# Patient Record
Sex: FEMALE | Race: WHITE | NOT HISPANIC OR LATINO | Employment: FULL TIME | URBAN - METROPOLITAN AREA
[De-identification: names, ages, dates, MRNs, and addresses within clinical notes are randomized per-mention and may not be internally consistent; named-entity substitution may affect disease eponyms.]

---

## 2018-09-04 ENCOUNTER — HOSPITAL ENCOUNTER (EMERGENCY)
Facility: HOSPITAL | Age: 58
Discharge: HOME | End: 2018-09-04
Attending: EMERGENCY MEDICINE | Admitting: EMERGENCY MEDICINE
Payer: COMMERCIAL

## 2018-09-04 ENCOUNTER — APPOINTMENT (EMERGENCY)
Dept: RADIOLOGY | Facility: HOSPITAL | Age: 58
End: 2018-09-04
Payer: COMMERCIAL

## 2018-09-04 VITALS
OXYGEN SATURATION: 98 % | BODY MASS INDEX: 26.07 KG/M2 | HEART RATE: 70 BPM | WEIGHT: 176 LBS | SYSTOLIC BLOOD PRESSURE: 128 MMHG | RESPIRATION RATE: 18 BRPM | DIASTOLIC BLOOD PRESSURE: 60 MMHG | TEMPERATURE: 98.2 F | HEIGHT: 69 IN

## 2018-09-04 DIAGNOSIS — T14.8XXA MUSCLE STRAIN: Primary | ICD-10-CM

## 2018-09-04 LAB
ANION GAP SERPL CALCULATED.3IONS-SCNC: 7 MMOL/L (ref 4–13)
ATRIAL RATE: 67 BPM
BUN SERPL-MCNC: 9 MG/DL (ref 7–25)
CALCIUM SERPL-MCNC: 9.3 MG/DL (ref 8.6–10.5)
CHLORIDE SERPL-SCNC: 107 MMOL/L (ref 98–107)
CO2 SERPL-SCNC: 27 MMOL/L (ref 21–31)
CREAT SERPL-MCNC: 0.89 MG/DL (ref 0.6–1.2)
EOSINOPHIL # BLD AUTO: 0.17 THOUSAND/UL (ref 0–0.61)
EOSINOPHIL NFR BLD MANUAL: 2 % (ref 0–6)
ERYTHROCYTE [DISTWIDTH] IN BLOOD BY AUTOMATED COUNT: 13 % (ref 11.5–14.5)
GFR SERPL CREATININE-BSD FRML MDRD: 72 ML/MIN/1.73SQ M
GLUCOSE SERPL-MCNC: 101 MG/DL (ref 65–99)
HCT VFR BLD AUTO: 39.2 % (ref 34.8–46.1)
HGB BLD-MCNC: 13.2 G/DL (ref 12–16)
HOLD SPECIMEN: NORMAL
LYMPHOCYTES # BLD AUTO: 2.94 THOUSAND/UL (ref 0.6–4.47)
LYMPHOCYTES # BLD AUTO: 35 % (ref 20–51)
MCH RBC QN AUTO: 31.1 PG (ref 26–34)
MCHC RBC AUTO-ENTMCNC: 33.8 G/DL (ref 31–37)
MCV RBC AUTO: 92 FL (ref 81–99)
MONOCYTES # BLD AUTO: 1.34 THOUSAND/UL (ref 0–1.22)
MONOCYTES NFR BLD AUTO: 16 % (ref 4–12)
NEUTS SEG # BLD: 3.95 THOUSAND/UL (ref 1.81–6.82)
NEUTS SEG NFR BLD AUTO: 47 % (ref 42–75)
NRBC BLD AUTO-RTO: 0 /100 WBCS
P AXIS: 66 DEGREES
PLATELET # BLD AUTO: 277 THOUSANDS/UL (ref 149–390)
PLATELET BLD QL SMEAR: ADEQUATE
PMV BLD AUTO: 8.3 FL (ref 8.6–11.7)
POTASSIUM SERPL-SCNC: 4.1 MMOL/L (ref 3.5–5.5)
PR INTERVAL: 140 MS
QRS AXIS: 59 DEGREES
QRSD INTERVAL: 86 MS
QT INTERVAL: 396 MS
QTC INTERVAL: 418 MS
RBC # BLD AUTO: 4.25 MILLION/UL (ref 3.9–5.2)
SODIUM SERPL-SCNC: 141 MMOL/L (ref 134–143)
T WAVE AXIS: 67 DEGREES
TOTAL CELLS COUNTED SPEC: 100
TROPONIN I SERPL-MCNC: <0.03 NG/ML
VENTRICULAR RATE: 67 BPM
WBC # BLD AUTO: 8.4 THOUSAND/UL (ref 4.8–10.8)

## 2018-09-04 PROCEDURE — 93005 ELECTROCARDIOGRAM TRACING: CPT

## 2018-09-04 PROCEDURE — 80048 BASIC METABOLIC PNL TOTAL CA: CPT | Performed by: EMERGENCY MEDICINE

## 2018-09-04 PROCEDURE — 84484 ASSAY OF TROPONIN QUANT: CPT | Performed by: EMERGENCY MEDICINE

## 2018-09-04 PROCEDURE — 71046 X-RAY EXAM CHEST 2 VIEWS: CPT

## 2018-09-04 PROCEDURE — 99284 EMERGENCY DEPT VISIT MOD MDM: CPT

## 2018-09-04 PROCEDURE — 73030 X-RAY EXAM OF SHOULDER: CPT

## 2018-09-04 PROCEDURE — 36415 COLL VENOUS BLD VENIPUNCTURE: CPT | Performed by: EMERGENCY MEDICINE

## 2018-09-04 PROCEDURE — 85007 BL SMEAR W/DIFF WBC COUNT: CPT | Performed by: EMERGENCY MEDICINE

## 2018-09-04 PROCEDURE — 85027 COMPLETE CBC AUTOMATED: CPT | Performed by: EMERGENCY MEDICINE

## 2018-09-04 RX ORDER — KETOROLAC TROMETHAMINE 10 MG/1
10 TABLET, FILM COATED ORAL EVERY 6 HOURS PRN
Qty: 20 TABLET | Refills: 0 | Status: SHIPPED | OUTPATIENT
Start: 2018-09-04

## 2018-09-04 RX ORDER — HYDROCODONE BITARTRATE AND ACETAMINOPHEN 5; 325 MG/1; MG/1
1 TABLET ORAL EVERY 6 HOURS PRN
Qty: 11 TABLET | Refills: 0 | Status: SHIPPED | OUTPATIENT
Start: 2018-09-04 | End: 2018-09-14

## 2018-09-04 RX ORDER — MULTIVITAMIN
1 TABLET ORAL DAILY
COMMUNITY

## 2018-09-04 NOTE — ED PROVIDER NOTES
History  Chief Complaint   Patient presents with    Shoulder Pain     isai spears left shoulder pain that began a few days ago  denies injury     Patient complaining of pain in her left shoulder area, holding her left trapezius muscle  She indicates that she did not injure the area but is just become worse and worse, disrupting her sleep  She also indicates her left scapula is painful and that the pain radiates down the arm causing swelling of her fingers  She denies any neck pain  She denies any aggravation of the pain with neck movement  She also noted some throbbing and sharp pain radiating across her upper chest starting from the anterior shoulder region  She is concerned as she had a sibling with coronary artery disease in their early 46s  The patient notes that she has been taking 3 Aleve twice a day for a few days with no improvement whatsoever  Prior to Admission Medications   Prescriptions Last Dose Informant Patient Reported? Taking? Multiple Vitamin (MULTIVITAMIN) tablet   Yes Yes   Sig: Take 1 tablet by mouth daily      Facility-Administered Medications: None       History reviewed  No pertinent past medical history  History reviewed  No pertinent surgical history  History reviewed  No pertinent family history  I have reviewed and agree with the history as documented  Social History   Substance Use Topics    Smoking status: Light Tobacco Smoker     Packs/day: 0 20    Smokeless tobacco: Never Used    Alcohol use No        Review of Systems   Constitutional: Negative for chills, fatigue and fever  Respiratory: Negative for chest tightness and shortness of breath  Cardiovascular:        Left upper chest wall pain, reproducible  Gastrointestinal: Positive for abdominal pain  Negative for abdominal distention  Musculoskeletal: Negative for back pain, neck pain and neck stiffness  Skin: Negative for rash and wound     Neurological: Negative for dizziness, numbness and headaches  Some occasional tingling noted in the left forearm and fingers  Physical Exam  Physical Exam   Constitutional: She appears well-developed and well-nourished  Patient is a pleasant ambulatory 24-year-old female who appears mildly uncomfortable with left upper extremity pain  She is alert and oriented with appropriate speech and affect  HENT:   Head: Normocephalic and atraumatic  Right Ear: External ear normal    Left Ear: External ear normal    Eyes: Conjunctivae and EOM are normal  Right eye exhibits no discharge  Left eye exhibits no discharge  Neck:   Left and right rotation are symmetric, to approximately 80° bilaterally  Nontender  No palpable tenderness to firm palpation of the cervical spine or thoracic spine  Cardiovascular: Normal rate, regular rhythm and normal heart sounds  Consistent palpable tenderness of the left upper chest wall in the subclavicular region  No crepitus  Pulmonary/Chest: Effort normal and breath sounds normal  No respiratory distress  She has no wheezes  Musculoskeletal: She exhibits no edema or deformity  Moderate spasm of the left trapezius  Slightly more pronounced spasm of the left thoracic para spinal muscles  Minimal swelling of the fingers of the left hand  No other edema appreciated  Neurological: She is alert  No cranial nerve deficit  Skin: Skin is warm and dry  Capillary refill takes less than 2 seconds  No rash noted  No erythema  No pallor  Vitals reviewed        Vital Signs  ED Triage Vitals [09/04/18 1100]   Temperature Pulse Respirations Blood Pressure SpO2   98 °F (36 7 °C) 83 16 141/73 96 %      Temp Source Heart Rate Source Patient Position - Orthostatic VS BP Location FiO2 (%)   Temporal Monitor Sitting Left arm --      Pain Score       5           Vitals:    09/04/18 1100 09/04/18 1216 09/04/18 1322   BP: 141/73 121/59 128/60   Pulse: 83 65 70   Patient Position - Orthostatic VS: Sitting  Lying       Visual Acuity      ED Medications  Medications - No data to display    Diagnostic Studies  Results Reviewed     Procedure Component Value Units Date/Time    Basic metabolic panel [[id number]]  (Abnormal) Collected:  09/04/18 1130    Lab Status:  Final result Specimen:  Blood from Arm, Left Updated:  09/04/18 1214     Sodium 141 mmol/L      Potassium 4 1 mmol/L      Chloride 107 mmol/L      CO2 27 mmol/L      ANION GAP 7 mmol/L      BUN 9 mg/dL      Creatinine 0 89 mg/dL      Glucose 101 (H) mg/dL      Calcium 9 3 mg/dL      eGFR 72 ml/min/1 73sq m     Narrative:         National Kidney Disease Education Program recommendations are as follows:  GFR calculation is accurate only with a steady state creatinine  Chronic Kidney disease less than 60 ml/min/1 73 sq  meters  Kidney failure less than 15 ml/min/1 73 sq  meters  Troponin I [[id number]]  (Normal) Collected:  09/04/18 1130    Lab Status:  Final result Specimen:  Blood from Arm, Left Updated:  09/04/18 1212     Troponin I <0 03 ng/mL     CBC and differential [[id number]]  (Abnormal) Collected:  09/04/18 1130    Lab Status:  Final result Specimen:  Blood from Arm, Left Updated:  09/04/18 1156     WBC 8 40 Thousand/uL      RBC 4 25 Million/uL      Hemoglobin 13 2 g/dL      Hematocrit 39 2 %      MCV 92 fL      MCH 31 1 pg      MCHC 33 8 g/dL      RDW 13 0 %      MPV 8 3 (L) fL      Platelets 115 Thousands/uL      nRBC 0 /100 WBCs     Fleming draw [[id number]] Collected:  09/04/18 1132    Lab Status:  Final result Specimen:  Blood from Arm, Left Updated:  09/04/18 1145    Narrative: The following orders were created for panel order Fleming draw    Procedure                               Abnormality         Status                     ---------                               -----------         ------                     Josephus Primrose Top on XNTK[[id number]]                            Final result               Green / Black tube on FKWY[[id number]]                        Final result                 Please view results for these tests on the individual orders  XR chest 2 views   Final Result by Lee Hanks (09/04 1220)   No acute thoracic findings  Signed by Ambrose Carmona MD      XR shoulder 2+ views LEFT   Final Result by Marty Orozco (09/04 1210)   Possible chronic rotator cuff pathology  Signed by Ksenia Hernandez MD                 Procedures  Procedures       Phone Contacts  ED Phone Contact    ED Course        examination and evaluation:  As noted patient having left upper extremity pain, with radiation across the left hemithorax  Because of that historical component, laboratory studies and EKG were performed  Troponin is normal and EKG is normal  On physical exam there is definitive reproducible tenderness of both the upper chest wall, the trapezius, upper thoracic muscles as well as the deltoid region which suggest both a muscle strain as well as a bursitis  X-ray show potential chronic rotator cuff pathology  Overall  She did prefer discharge, with likely muscle strain and bursitis  As noted EKG and troponin tests are normal                         MDM  CritCare Time    Disposition  Final diagnoses:   Muscle strain     Time reflects when diagnosis was documented in both MDM as applicable and the Disposition within this note     Time User Action Codes Description Comment    9/4/2018  1:39 PM Fortunato Elder 298  8XXA] Muscle strain       ED Disposition     ED Disposition Condition Comment    Discharge  Thien Ser discharge to home/self care      Condition at discharge: Stable        Follow-up Information    None         Discharge Medication List as of 9/4/2018  1:44 PM      START taking these medications    Details   HYDROcodone-acetaminophen (NORCO) 5-325 mg per tablet Take 1 tablet by mouth every 6 (six) hours as needed for pain for up to 10 days Max Daily Amount: 4 tablets, Starting Tue 9/4/2018, Until Fri 9/14/2018, Print ketorolac (TORADOL) 10 mg tablet Take 1 tablet (10 mg total) by mouth every 6 (six) hours as needed for moderate pain, Starting Tue 9/4/2018, Print         CONTINUE these medications which have NOT CHANGED    Details   Multiple Vitamin (MULTIVITAMIN) tablet Take 1 tablet by mouth daily, Historical Med           No discharge procedures on file      ED Provider  Electronically Signed by           Hilda Nieto DO  09/04/18 9962

## 2018-09-04 NOTE — DISCHARGE INSTRUCTIONS
YOUR EXAM SUGGESTS MUSCLE STRAIN AND SPASM OF YOUR TRAPEZIUS MUSCLE AS WELL AS A BURSITIS OF YOUR ROTATOR CUFF ON THE LEFT UPPER EXTREMITY  USE THE SLING FOR COMFORT  ADEQUATELY THROUGHOUT THE DAY BUT NOT CONSTANTLY  TAKE TORADOL AS PRESCRIBED  DO NOT USE IBUPROFEN OR ALEVE WHILE TAKING TORADOL  FOR UNCONTROLLED PAIN USE THE VICODIN PRESCRIPTION  PLEASE CALL YOUR FAMILY DOCTOR TODAY TO DISCUSS THE POTENTIAL ORTHOPEDIC OR PHYSICAL THERAPY REFERRAL  Muscle Strain   WHAT YOU NEED TO KNOW:   A muscle strain is a twist, pull, or tear of a muscle or tendon  A tendon is a strong elastic tissue that connects a muscle to a bone  Signs of a strained muscle include bruising and swelling over the area, pain with movement, and loss of strength  DISCHARGE INSTRUCTIONS:   Return to the emergency department if:   · You suddenly cannot feel or move your injured muscle  Contact your healthcare provider if:   · Your pain and swelling worsen or do not go away  · You have questions or concerns about your condition or care  Medicines:   · NSAIDs  help decrease swelling and pain or fever  This medicine is available with or without a doctor's order  NSAIDs can cause stomach bleeding or kidney problems in certain people  If you take blood thinner medicine, always ask your healthcare provider if NSAIDs are safe for you  Always read the medicine label and follow directions  · Muscle relaxers  help decrease pain and muscle spasms  · Take your medicine as directed  Contact your healthcare provider if you think your medicine is not helping or if you have side effects  Tell him of her if you are allergic to any medicine  Keep a list of the medicines, vitamins, and herbs you take  Include the amounts, and when and why you take them  Bring the list or the pill bottles to follow-up visits  Carry your medicine list with you in case of an emergency  Follow up with your healthcare provider as directed:   Your healthcare provider may suggest that you have a follow-up visit before you go back to your usual activity  Write down your questions so you remember to ask them during your visits  Self-care:   · 3 to 7 days after the injury:  Use Rest, Ice, Compression, and Elevation (RICE) to help stop bruising and decrease pain and swelling  ¨ Rest:  Rest your muscle to allow your injury to heal  When the pain decreases, begin normal, slow movements  For mild and moderate muscle strains, you should rest your muscles for about 2 days  However, if you have a severe muscle strain, you should rest for 10 to 14 days  You may need to use crutches to walk if your muscle strain is in your legs or lower body  ¨ Ice:  Put an ice pack on the injured area  Put a towel between the ice pack and your skin  Do not put the ice pack directly on your skin  You can use a package of frozen peas instead of an ice pack  ¨ Compression:  You may need to wrap an elastic bandage around the area to decrease swelling  It should be tight enough for you to feel support  Do not wrap it too tightly  ¨ Elevation:  Keep the injured muscle raised above your heart if possible  For example if you have a strain of your lower leg muscle, lie down and prop your leg up on pillows  This helps decrease pain and swelling  · 3 to 21 days after the injury:  Start to slowly and regularly exercise your muscle  This will help it heal  If you feel pain, decrease how hard you are exercising  · 1 to 6 weeks after the injury:  Stretch the injured muscle  Hold the stretch for about 30 seconds  Do this 4 times a day  You may stretch the muscle until you feel a slight pull  Stop stretching if you feel pain  · 2 weeks to 6 months after the injury:  The goal of this phase is to return to the activity you were doing before the injury happened, without hurting the muscle again       · 3 weeks to 6 months after the injury:  Keep stretching and strengthening your muscles to avoid injury  Slowly increase the time and distance that you exercise  You may have signs and symptoms of muscle strain 6 months after the injury, even if you do things to help it heal  In this case, you may need surgery on the muscle  © 2017 [employer] Information is for End User's use only and may not be sold, redistributed or otherwise used for commercial purposes  All illustrations and images included in CareNotes® are the copyrighted property of A [employer] M [employer]  or Skip Saldivar  The above information is an  only  It is not intended as medical advice for individual conditions or treatments  Talk to your doctor, nurse or pharmacist before following any medical regimen to see if it is safe and effective for you

## 2019-04-26 ENCOUNTER — OFFICE VISIT (OUTPATIENT)
Dept: OBGYN CLINIC | Facility: MEDICAL CENTER | Age: 59
End: 2019-04-26
Payer: COMMERCIAL

## 2019-04-26 VITALS
SYSTOLIC BLOOD PRESSURE: 120 MMHG | DIASTOLIC BLOOD PRESSURE: 60 MMHG | WEIGHT: 181.2 LBS | HEIGHT: 69 IN | BODY MASS INDEX: 26.84 KG/M2

## 2019-04-26 DIAGNOSIS — Z01.419 ENCOUNTER FOR WELL WOMAN EXAM WITH ROUTINE GYNECOLOGICAL EXAM: Primary | ICD-10-CM

## 2019-04-26 DIAGNOSIS — Z12.31 ENCOUNTER FOR SCREENING MAMMOGRAM FOR MALIGNANT NEOPLASM OF BREAST: ICD-10-CM

## 2019-04-26 PROCEDURE — S0610 ANNUAL GYNECOLOGICAL EXAMINA: HCPCS | Performed by: OBSTETRICS & GYNECOLOGY

## 2019-04-30 LAB
CLINICAL INFO: NORMAL
CYTOLOGY CMNT CVX/VAG CYTO-IMP: NORMAL
DATE PREVIOUS BX: NORMAL
HPV E6+E7 MRNA CVX QL NAA+PROBE: NOT DETECTED
LMP START DATE: NORMAL
SL AMB PREV. PAP:: NORMAL
SPECIMEN SOURCE CVX/VAG CYTO: NORMAL

## 2019-05-09 ENCOUNTER — HOSPITAL ENCOUNTER (OUTPATIENT)
Dept: MAMMOGRAPHY | Facility: HOSPITAL | Age: 59
Discharge: HOME | End: 2019-05-09
Attending: OBSTETRICS & GYNECOLOGY
Payer: COMMERCIAL

## 2019-05-09 VITALS — BODY MASS INDEX: 26.81 KG/M2 | HEIGHT: 69 IN | WEIGHT: 181 LBS

## 2019-05-09 DIAGNOSIS — Z12.31 ENCOUNTER FOR SCREENING MAMMOGRAM FOR MALIGNANT NEOPLASM OF BREAST: ICD-10-CM

## 2019-05-09 PROCEDURE — 77063 BREAST TOMOSYNTHESIS BI: CPT

## 2019-05-09 PROCEDURE — 77067 SCR MAMMO BI INCL CAD: CPT

## 2020-12-31 ENCOUNTER — LAB (OUTPATIENT)
Dept: LAB | Facility: HOSPITAL | Age: 60
End: 2020-12-31
Attending: INTERNAL MEDICINE
Payer: COMMERCIAL

## 2020-12-31 ENCOUNTER — TRANSCRIBE ORDERS (OUTPATIENT)
Dept: LAB | Facility: HOSPITAL | Age: 60
End: 2020-12-31

## 2020-12-31 DIAGNOSIS — R53.83 FATIGUE, UNSPECIFIED TYPE: ICD-10-CM

## 2020-12-31 DIAGNOSIS — M25.50 ARTHRALGIA, UNSPECIFIED JOINT: ICD-10-CM

## 2020-12-31 DIAGNOSIS — Z79.899 ENCOUNTER FOR LONG-TERM (CURRENT) USE OF OTHER MEDICATIONS: ICD-10-CM

## 2020-12-31 DIAGNOSIS — R73.9 HYPERGLYCEMIA: ICD-10-CM

## 2020-12-31 DIAGNOSIS — M25.50 ARTHRALGIA, UNSPECIFIED JOINT: Primary | ICD-10-CM

## 2020-12-31 LAB
25(OH)D3 SERPL-MCNC: 15.2 NG/ML (ref 30–100)
ALBUMIN SERPL BCP-MCNC: 4.1 G/DL (ref 3.5–5.7)
ALP SERPL-CCNC: 53 U/L (ref 55–165)
ALT SERPL W P-5'-P-CCNC: 13 U/L (ref 7–52)
ANION GAP SERPL CALCULATED.3IONS-SCNC: 6 MMOL/L (ref 4–13)
AST SERPL W P-5'-P-CCNC: 15 U/L (ref 13–39)
BILIRUB SERPL-MCNC: 0.2 MG/DL (ref 0.2–1)
BUN SERPL-MCNC: 19 MG/DL (ref 7–25)
CALCIUM SERPL-MCNC: 9 MG/DL (ref 8.6–10.5)
CHLORIDE SERPL-SCNC: 105 MMOL/L (ref 98–107)
CHOLEST SERPL-MCNC: 194 MG/DL (ref 0–200)
CO2 SERPL-SCNC: 28 MMOL/L (ref 21–31)
CREAT SERPL-MCNC: 0.82 MG/DL (ref 0.6–1.2)
ERYTHROCYTE [DISTWIDTH] IN BLOOD BY AUTOMATED COUNT: 13.4 % (ref 11.5–14.5)
ERYTHROCYTE [SEDIMENTATION RATE] IN BLOOD: 34 MM/HOUR (ref 0–29)
EST. AVERAGE GLUCOSE BLD GHB EST-MCNC: 120 MG/DL
GFR SERPL CREATININE-BSD FRML MDRD: 78 ML/MIN/1.73SQ M
GLUCOSE P FAST SERPL-MCNC: 85 MG/DL (ref 65–99)
HBA1C MFR BLD: 5.8 %
HCT VFR BLD AUTO: 38.1 % (ref 42–47)
HCV AB SER QL: NORMAL
HDLC SERPL-MCNC: 60 MG/DL
HGB BLD-MCNC: 13 G/DL (ref 12–16)
LDLC SERPL CALC-MCNC: 112 MG/DL (ref 0–100)
MCH RBC QN AUTO: 31.9 PG (ref 26–34)
MCHC RBC AUTO-ENTMCNC: 34 G/DL (ref 31–37)
MCV RBC AUTO: 94 FL (ref 81–99)
NONHDLC SERPL-MCNC: 134 MG/DL
PLATELET # BLD AUTO: 295 THOUSANDS/UL (ref 149–390)
PMV BLD AUTO: 8.7 FL (ref 8.6–11.7)
POTASSIUM SERPL-SCNC: 4.2 MMOL/L (ref 3.5–5.5)
PROT SERPL-MCNC: 7 G/DL (ref 6.4–8.9)
RBC # BLD AUTO: 4.07 MILLION/UL (ref 3.9–5.2)
SODIUM SERPL-SCNC: 139 MMOL/L (ref 134–143)
T4 FREE SERPL-MCNC: 0.85 NG/DL (ref 0.76–1.46)
TRIGL SERPL-MCNC: 109 MG/DL (ref 44–166)
TSH SERPL DL<=0.05 MIU/L-ACNC: 2.47 UIU/ML (ref 0.45–5.33)
WBC # BLD AUTO: 7.9 THOUSAND/UL (ref 4.8–10.8)

## 2020-12-31 PROCEDURE — 86803 HEPATITIS C AB TEST: CPT

## 2020-12-31 PROCEDURE — 84482 T3 REVERSE: CPT

## 2020-12-31 PROCEDURE — 84443 ASSAY THYROID STIM HORMONE: CPT

## 2020-12-31 PROCEDURE — 84439 ASSAY OF FREE THYROXINE: CPT

## 2020-12-31 PROCEDURE — 80053 COMPREHEN METABOLIC PANEL: CPT

## 2020-12-31 PROCEDURE — 36415 COLL VENOUS BLD VENIPUNCTURE: CPT

## 2020-12-31 PROCEDURE — 85652 RBC SED RATE AUTOMATED: CPT

## 2020-12-31 PROCEDURE — 82306 VITAMIN D 25 HYDROXY: CPT

## 2020-12-31 PROCEDURE — 80061 LIPID PANEL: CPT

## 2020-12-31 PROCEDURE — 85027 COMPLETE CBC AUTOMATED: CPT

## 2020-12-31 PROCEDURE — 83036 HEMOGLOBIN GLYCOSYLATED A1C: CPT

## 2021-01-06 LAB — T3REVERSE SERPL-MCNC: 11.7 NG/DL (ref 9.2–24.1)

## 2021-01-25 ENCOUNTER — TRANSCRIBE ORDERS (OUTPATIENT)
Dept: ADMINISTRATIVE | Facility: HOSPITAL | Age: 61
End: 2021-01-25

## 2021-01-25 DIAGNOSIS — Z12.31 ENCOUNTER FOR SCREENING MAMMOGRAM FOR MALIGNANT NEOPLASM OF BREAST: Primary | ICD-10-CM

## 2021-01-29 ENCOUNTER — HOSPITAL ENCOUNTER (OUTPATIENT)
Dept: MAMMOGRAPHY | Facility: HOSPITAL | Age: 61
Discharge: HOME | End: 2021-01-29
Payer: COMMERCIAL

## 2021-01-29 VITALS — HEIGHT: 69 IN | BODY MASS INDEX: 26.81 KG/M2 | WEIGHT: 181 LBS

## 2021-01-29 DIAGNOSIS — Z12.31 ENCOUNTER FOR SCREENING MAMMOGRAM FOR MALIGNANT NEOPLASM OF BREAST: ICD-10-CM

## 2021-01-29 PROCEDURE — 77063 BREAST TOMOSYNTHESIS BI: CPT

## 2021-01-29 PROCEDURE — 77067 SCR MAMMO BI INCL CAD: CPT

## 2021-03-10 DIAGNOSIS — Z23 ENCOUNTER FOR IMMUNIZATION: ICD-10-CM

## 2021-03-16 ENCOUNTER — IMMUNIZATIONS (OUTPATIENT)
Dept: FAMILY MEDICINE CLINIC | Facility: HOSPITAL | Age: 61
End: 2021-03-16

## 2021-03-16 DIAGNOSIS — Z23 ENCOUNTER FOR IMMUNIZATION: Primary | ICD-10-CM

## 2021-03-16 PROCEDURE — 0001A SARS-COV-2 / COVID-19 MRNA VACCINE (PFIZER-BIONTECH) 30 MCG: CPT

## 2021-03-16 PROCEDURE — 91300 SARS-COV-2 / COVID-19 MRNA VACCINE (PFIZER-BIONTECH) 30 MCG: CPT

## 2021-04-08 ENCOUNTER — IMMUNIZATIONS (OUTPATIENT)
Dept: FAMILY MEDICINE CLINIC | Facility: HOSPITAL | Age: 61
End: 2021-04-08

## 2021-04-08 DIAGNOSIS — Z23 ENCOUNTER FOR IMMUNIZATION: Primary | ICD-10-CM

## 2021-04-08 PROCEDURE — 91300 SARS-COV-2 / COVID-19 MRNA VACCINE (PFIZER-BIONTECH) 30 MCG: CPT

## 2021-04-08 PROCEDURE — 0002A SARS-COV-2 / COVID-19 MRNA VACCINE (PFIZER-BIONTECH) 30 MCG: CPT

## 2021-08-24 ENCOUNTER — APPOINTMENT (OUTPATIENT)
Dept: RADIOLOGY | Facility: CLINIC | Age: 61
End: 2021-08-24
Payer: COMMERCIAL

## 2021-08-24 ENCOUNTER — OFFICE VISIT (OUTPATIENT)
Dept: URGENT CARE | Facility: CLINIC | Age: 61
End: 2021-08-24
Payer: COMMERCIAL

## 2021-08-24 VITALS
SYSTOLIC BLOOD PRESSURE: 132 MMHG | HEART RATE: 86 BPM | HEIGHT: 69 IN | WEIGHT: 168 LBS | TEMPERATURE: 98.1 F | BODY MASS INDEX: 24.88 KG/M2 | DIASTOLIC BLOOD PRESSURE: 62 MMHG | RESPIRATION RATE: 18 BRPM | OXYGEN SATURATION: 97 %

## 2021-08-24 DIAGNOSIS — R07.81 RIB PAIN ON RIGHT SIDE: ICD-10-CM

## 2021-08-24 DIAGNOSIS — M54.50 ACUTE BILATERAL LOW BACK PAIN WITHOUT SCIATICA: ICD-10-CM

## 2021-08-24 DIAGNOSIS — R07.81 RIB PAIN ON RIGHT SIDE: Primary | ICD-10-CM

## 2021-08-24 PROCEDURE — 96372 THER/PROPH/DIAG INJ SC/IM: CPT | Performed by: PHYSICIAN ASSISTANT

## 2021-08-24 PROCEDURE — 99213 OFFICE O/P EST LOW 20 MIN: CPT | Performed by: PHYSICIAN ASSISTANT

## 2021-08-24 PROCEDURE — 71101 X-RAY EXAM UNILAT RIBS/CHEST: CPT

## 2021-08-24 RX ORDER — PREDNISONE 10 MG/1
TABLET ORAL
Qty: 26 TABLET | Refills: 0 | Status: SHIPPED | OUTPATIENT
Start: 2021-08-24

## 2021-08-24 RX ORDER — KETOROLAC TROMETHAMINE 30 MG/ML
30 INJECTION, SOLUTION INTRAMUSCULAR; INTRAVENOUS ONCE
Status: COMPLETED | OUTPATIENT
Start: 2021-08-24 | End: 2021-08-24

## 2021-08-24 RX ORDER — CYCLOBENZAPRINE HCL 10 MG
10 TABLET ORAL 3 TIMES DAILY PRN
Qty: 30 TABLET | Refills: 0 | Status: SHIPPED | OUTPATIENT
Start: 2021-08-24

## 2021-08-24 RX ADMIN — KETOROLAC TROMETHAMINE 30 MG: 30 INJECTION, SOLUTION INTRAMUSCULAR; INTRAVENOUS at 19:26

## 2021-08-24 NOTE — PROGRESS NOTES
[facility]    NAME: Ernesto Latif is a 64 y o  female  : 1960    MRN: [id number]  DATE: 2021  TIME: 7:06 PM    Assessment and Plan   Rib pain on right side [R07 81]  1  Rib pain on right side  XR ribs right w pa chest min 3 views    ketorolac (TORADOL) injection 30 mg   2  Acute bilateral low back pain without sciatica  predniSONE 10 mg tablet    cyclobenzaprine (FLEXERIL) 10 mg tablet       Patient Instructions   Patient Instructions   Start prednisone to help reduce inflammation  Take as directed  Muscle relaxer as needed  Do not take muscle relaxer if you're going to be driving and do not take with alcohol  Apply ice to the area 3-4 times daily for 20-30 minutes  After 2-3 days can start using moist heat a few times a day and do gentle stretches  If not improving over the next week, follow up with PCP or orthopedics  Chief Complaint     Chief Complaint   Patient presents with    Rib Pain     right x 1 day       History of Present Illness     61-year-old female here with complaint right rib pain  Patient states she has been babying her back for the last week  Has had issues with bulging discs in her back off and on  Bent over to pick something up and started with pain in her right anterior ribcage  She felt a pop she bent over  Denies cough or shortness of breath  Has some mild discomfort and pain with taking a deep breath  Has been taking over-the-counter Aleve with minimal relief  Review of Systems   Review of Systems   Constitutional: Negative for chills and fever  Respiratory: Negative for cough and shortness of breath  Cardiovascular: Positive for chest pain (right anterior chest wall  )  Negative for palpitations and leg swelling  Musculoskeletal: Positive for back pain  Neurological: Negative for weakness and numbness         Current Medications     Current Outpatient Medications:     cyclobenzaprine (FLEXERIL) 10 mg tablet, Take 1 tablet (10 mg total) by mouth 3 (three) times a day as needed for muscle spasms, Disp: 30 tablet, Rfl: 0    ketorolac (TORADOL) 10 mg tablet, Take 1 tablet (10 mg total) by mouth every 6 (six) hours as needed for moderate pain (Patient not taking: Reported on 4/26/2019), Disp: 20 tablet, Rfl: 0    Multiple Vitamin (MULTIVITAMIN) tablet, Take 1 tablet by mouth daily (Patient not taking: Reported on 8/24/2021), Disp: , Rfl:     predniSONE 10 mg tablet, Take 3 tabs BID X 2 days, 2 tabs BID X 2 days, 1 tab BID X 2 days, 1 tab daily X 2 days, Disp: 26 tablet, Rfl: 0    Current Facility-Administered Medications:     ketorolac (TORADOL) injection 30 mg, 30 mg, Intramuscular, Once, [employer], PA-C    Current Allergies     Allergies as of 08/24/2021    (No Known Allergies)          The following portions of the patient's history were reviewed and updated as appropriate: allergies, current medications, past family history, past medical history, past social history, past surgical history and problem list    History reviewed  No pertinent past medical history    Past Surgical History:   Procedure Laterality Date    OVARIAN CYST REMOVAL Right 1992    TUBAL LIGATION  1995     Family History   Problem Relation Age of Onset    Hypertension Mother     Lung cancer Father 66    Hypertension Father     Breast cancer Sister 61    Cervical cancer Sister         25s    Breast cancer Other 29    No Known Problems Daughter     Breast cancer Maternal Grandmother 61    No Known Problems Daughter     Cervical cancer Other 28    Breast cancer Other 45    Breast cancer Maternal Aunt     No Known Problems Maternal Aunt     No Known Problems Maternal Aunt     No Known Problems Paternal Aunt     No Known Problems Paternal Aunt     No Known Problems Paternal Aunt      Social History     Socioeconomic History    Marital status: /Civil Union     Spouse name: Not on file    Number of children: Not on file    Years of education: Not on file    Highest education level: Not on file   Occupational History    Not on file   Tobacco Use    Smoking status: Former Smoker     Packs/day: 0 20    Smokeless tobacco: Never Used   Substance and Sexual Activity    Alcohol use: Yes     Comment: occasional     Drug use: Never    Sexual activity: Not Currently     Partners: Male     Birth control/protection: None   Other Topics Concern    Not on file   Social History Narrative    Not on file     Social Determinants of Health     Financial Resource Strain:     Difficulty of Paying Living Expenses:    Food Insecurity:     Worried About Running Out of Food in the Last Year:     [address] in the Last Year:    Transportation Needs:     Lack of Transportation (Medical):  Lack of Transportation (Non-Medical):    Physical Activity:     Days of Exercise per Week:     Minutes of Exercise per Session:    Stress:     Feeling of Stress :    Social Connections:     Frequency of Communication with Friends and Family:     Frequency of Social Gatherings with Friends and Family:     Attends Anabaptist Services:     Active Member of Clubs or Organizations:     Attends Club or Organization Meetings:     Marital Status:    Intimate Partner Violence:     Fear of Current or Ex-Partner:     Emotionally Abused:     Physically Abused:     Sexually Abused:      Medications have been verified  Objective   /62   Pulse 86   Temp 98 1 °F (36 7 °C)   Resp 18   Ht 5' 9" (1 753 m)   Wt 76 2 kg (168 lb)   SpO2 97%   BMI 24 81 kg/m²      Physical Exam   Physical Exam  Vitals and nursing note reviewed  Constitutional:       Appearance: Normal appearance  HENT:      Head: Normocephalic and atraumatic  Cardiovascular:      Rate and Rhythm: Normal rate and regular rhythm  Pulses: Normal pulses  Heart sounds: Normal heart sounds     Pulmonary:      Effort: Pulmonary effort is normal       Breath sounds: Normal breath sounds  Chest:      Chest wall: Tenderness (right anterior chest wall) present  Musculoskeletal:      Cervical back: Normal range of motion  Lumbar back: Spasms and tenderness present  No swelling  Decreased range of motion  Neurological:      Mental Status: She is alert

## 2021-09-21 ENCOUNTER — HOSPITAL ENCOUNTER (OUTPATIENT)
Dept: BONE DENSITY | Facility: HOSPITAL | Age: 61
Discharge: HOME | End: 2021-09-21
Attending: INTERNAL MEDICINE
Payer: COMMERCIAL

## 2021-09-21 DIAGNOSIS — M81.0 SENILE OSTEOPOROSIS: ICD-10-CM

## 2021-09-21 PROCEDURE — 77080 DXA BONE DENSITY AXIAL: CPT

## 2022-12-28 ENCOUNTER — OFFICE VISIT (OUTPATIENT)
Dept: URGENT CARE | Facility: CLINIC | Age: 62
End: 2022-12-28

## 2022-12-28 VITALS — OXYGEN SATURATION: 98 % | RESPIRATION RATE: 18 BRPM | TEMPERATURE: 98.5 F | HEART RATE: 81 BPM

## 2022-12-28 DIAGNOSIS — J01.00 ACUTE MAXILLARY SINUSITIS, RECURRENCE NOT SPECIFIED: Primary | ICD-10-CM

## 2022-12-28 RX ORDER — AMOXICILLIN AND CLAVULANATE POTASSIUM 875; 125 MG/1; MG/1
1 TABLET, FILM COATED ORAL EVERY 12 HOURS SCHEDULED
Qty: 14 TABLET | Refills: 0 | Status: SHIPPED | OUTPATIENT
Start: 2022-12-28 | End: 2023-01-04

## 2022-12-28 RX ORDER — FLUTICASONE PROPIONATE 50 MCG
1 SPRAY, SUSPENSION (ML) NASAL 2 TIMES DAILY PRN
Qty: 9.9 ML | Refills: 0 | Status: SHIPPED | OUTPATIENT
Start: 2022-12-28 | End: 2023-01-27

## 2022-12-28 NOTE — PROGRESS NOTES
[facility]        NAME: Luis Jo is a 58 y o  female  : 1960    MRN: [id number]  DATE: 2022  TIME: 10:17 AM    Assessment and Plan   Acute maxillary sinusitis, recurrence not specified [J01 00]  1  Acute maxillary sinusitis, recurrence not specified  amoxicillin-clavulanate (AUGMENTIN) 875-125 mg per tablet    fluticasone (FLONASE) 50 mcg/act nasal spray            Patient Instructions     Patient Instructions     Nasal saline spray  Flonase and augmentin  Start sudafed otc  Tylenol and motrin for pain or fever  Follow up with Primary Care Physician  Return to clinic or go to the nearest Emergency Department with new or worsening symptoms as discussed  Sinusitis   WHAT YOU NEED TO KNOW:   Sinusitis is inflammation or infection of your sinuses  Sinusitis is most often caused by a virus  Acute sinusitis may last up to 12 weeks  Chronic sinusitis lasts longer than 12 weeks  Recurrent sinusitis means you have 4 or more infections in 1 year  DISCHARGE INSTRUCTIONS:   Return to the emergency department if:   · You have trouble breathing or wheezing that is getting worse  · You have a stiff neck, a fever, or a bad headache  · You cannot open your eye  · Your eyeball bulges out or you cannot move your eye  · You are more sleepy than normal, or you notice changes in your ability to think, move, or talk  · You have swelling of your forehead or scalp  Call your doctor if:   · You have vision changes, such as double vision  · Your eye and eyelid are red, swollen, and painful  · Your symptoms do not improve or go away after 10 days  · You have nausea and are vomiting  · Your nose is bleeding  · You have questions or concerns about your condition or care  Medicines: Your symptoms may go away on their own  Your healthcare provider may recommend watchful waiting for up to 10 days before starting antibiotics   You may need any of the following:  · Acetaminophen  decreases pain and fever  It is available without a doctor's order  Ask how much to take and how often to take it  Follow directions  Read the labels of all other medicines you are using to see if they also contain acetaminophen, or ask your doctor or pharmacist  Acetaminophen can cause liver damage if not taken correctly  Do not use more than 4 grams (4,000 milligrams) total of acetaminophen in one day  · NSAIDs , such as ibuprofen, help decrease swelling, pain, and fever  This medicine is available with or without a doctor's order  NSAIDs can cause stomach bleeding or kidney problems in certain people  If you take blood thinner medicine, always ask your healthcare provider if NSAIDs are safe for you  Always read the medicine label and follow directions  · Nasal steroid sprays  may help decrease inflammation in your nose and sinuses  · Decongestants  help reduce swelling and drain mucus in the nose and sinuses  They may help you breathe easier  · Antihistamines  help dry mucus in the nose and relieve sneezing  · Antibiotics  help treat or prevent a bacterial infection  · Take your medicine as directed  Contact your healthcare provider if you think your medicine is not helping or if you have side effects  Tell him or her if you are allergic to any medicine  Keep a list of the medicines, vitamins, and herbs you take  Include the amounts, and when and why you take them  Bring the list or the pill bottles to follow-up visits  Carry your medicine list with you in case of an emergency  Self-care:   · Rinse your sinuses as directed  Use a sinus rinse device to rinse your nasal passages with a saline (salt water) solution or distilled water  Do not use tap water  This will help thin the mucus in your nose and rinse away pollen and dirt  It will also help reduce swelling so you can breathe normally  · Use a humidifier  to increase air moisture in your home   This may make it easier for you to breathe and help decrease your cough  · Sleep with your head elevated  Place an extra pillow under your head before you go to sleep to help your sinuses drain  · Drink liquids as directed  Ask your healthcare provider how much liquid to drink each day and which liquids are best for you  Liquids will thin the mucus in your nose and help it drain  Avoid drinks that contain alcohol or caffeine  · Do not smoke, and avoid secondhand smoke  Nicotine and other chemicals in cigarettes and cigars can make your symptoms worse  Ask your healthcare provider for information if you currently smoke and need help to quit  E-cigarettes or smokeless tobacco still contain nicotine  Talk to your healthcare provider before you use these products  Prevent the spread of germs:   · Wash your hands often with soap and water  Wash your hands after you use the bathroom, change a child's diaper, or sneeze  Wash your hands before you prepare or eat food  · Stay away from people who are sick  Some germs spread easily and quickly through contact  Follow up with your doctor as directed: You may be referred to an ear, nose, and throat specialist  Write down your questions so you remember to ask them during your visits  © Copyright [employer] 2022 Information is for End User's use only and may not be sold, redistributed or otherwise used for commercial purposes  All illustrations and images included in CareNotes® are the copyrighted property of [employer]  or [place] Nguyen   The above information is an  only  It is not intended as medical advice for individual conditions or treatments  Talk to your doctor, nurse or pharmacist before following any medical regimen to see if it is safe and effective for you  **Portions of the record may have been created with voice recognition software    Occasional wrong word or "sound a like" substitutions may have occurred due to the inherent limitations of voice recognition software  Read the chart carefully and recognize, using context, where substitutions have occurred  **     Chief Complaint     Chief Complaint   Patient presents with   • Sinus Problem     Sinus congestion, ear pain, and eye redness for 5 days  History of Present Illness     Katherine Correa is a 58 y o  female presents to clinic today with complaints of sinus pressure and pain, ear pain x 5 days  reprots swelling of b/l maxillary sinuses  Denies fever, cough, shortness of breath, n/v/d  Review of Systems     Review of Systems   Constitutional: Negative for chills, fatigue and fever  HENT: Positive for ear pain, facial swelling, sinus pressure and sinus pain  Negative for congestion, postnasal drip, rhinorrhea, sore throat and voice change  Eyes: Negative for discharge and redness  Respiratory: Negative for cough, shortness of breath and wheezing  Cardiovascular: Negative for chest pain  Gastrointestinal: Negative for diarrhea, nausea and vomiting  Musculoskeletal: Negative for myalgias  Neurological: Positive for headaches  Negative for dizziness  Hematological: Negative for adenopathy           Current Medications     Current Outpatient Medications:   •  amoxicillin-clavulanate (AUGMENTIN) 875-125 mg per tablet, Take 1 tablet by mouth every 12 (twelve) hours for 7 days, Disp: 14 tablet, Rfl: 0  •  fluticasone (FLONASE) 50 mcg/act nasal spray, 1 spray into each nostril 2 (two) times a day as needed for rhinitis, Disp: 9 9 mL, Rfl: 0  •  cyclobenzaprine (FLEXERIL) 10 mg tablet, Take 1 tablet (10 mg total) by mouth 3 (three) times a day as needed for muscle spasms, Disp: 30 tablet, Rfl: 0  •  ketorolac (TORADOL) 10 mg tablet, Take 1 tablet (10 mg total) by mouth every 6 (six) hours as needed for moderate pain (Patient not taking: Reported on 4/26/2019), Disp: 20 tablet, Rfl: 0  •  Multiple Vitamin (MULTIVITAMIN) tablet, Take 1 tablet by mouth daily (Patient not taking: Reported on 8/24/2021), Disp: , Rfl:   •  predniSONE 10 mg tablet, Take 3 tabs BID X 2 days, 2 tabs BID X 2 days, 1 tab BID X 2 days, 1 tab daily X 2 days (Patient not taking: Reported on 12/28/2022), Disp: 26 tablet, Rfl: 0    Current Allergies     Allergies as of 12/28/2022   • (No Known Allergies)            The following portions of the patient's history were reviewed and updated as appropriate: allergies, current medications, past family history, past medical history, past social history, past surgical history and problem list      No past medical history on file  Past Surgical History:   Procedure Laterality Date   • OVARIAN CYST REMOVAL Right 1992   • TUBAL LIGATION  1995       Family History   Problem Relation Age of Onset   • Hypertension Mother    • Lung cancer Father 66   • Hypertension Father    • Breast cancer Sister 61   • Cervical cancer Sister         25s   • Breast cancer Other 29   • No Known Problems Daughter    • Breast cancer Maternal Grandmother 61   • No Known Problems Daughter    • Cervical cancer Other 28   • Breast cancer Other 45   • Breast cancer Maternal Aunt    • No Known Problems Maternal Aunt    • No Known Problems Maternal Aunt    • No Known Problems Paternal Aunt    • No Known Problems Paternal Aunt    • No Known Problems Paternal Aunt          Medications have been verified  Objective     Pulse 81   Temp 98 5 °F (36 9 °C)   Resp 18   SpO2 98%        Physical Exam     Physical Exam  Vitals and nursing note reviewed  Constitutional:       General: She is not in acute distress  Appearance: Normal appearance  She is not ill-appearing  HENT:      Head: Normocephalic and atraumatic  Right Ear: Tympanic membrane is injected and erythematous  Left Ear: Tympanic membrane normal  Tympanic membrane is not injected or erythematous  Nose: Congestion present  Right Turbinates: Swollen        Left Turbinates: Swollen  Right Sinus: Maxillary sinus tenderness present  Left Sinus: Maxillary sinus tenderness present  Comments: Facial swelling over b/l maxillary sinuses     Mouth/Throat:      Mouth: Mucous membranes are moist       Pharynx: Oropharynx is clear  Cardiovascular:      Rate and Rhythm: Normal rate and regular rhythm  Pulses: Normal pulses  Heart sounds: Normal heart sounds  Pulmonary:      Effort: Pulmonary effort is normal       Breath sounds: Normal breath sounds  Lymphadenopathy:      Cervical: No cervical adenopathy  Skin:     General: Skin is warm and dry  Findings: No rash  Neurological:      Mental Status: She is alert

## 2022-12-28 NOTE — PATIENT INSTRUCTIONS
Nasal saline spray  Flonase and augmentin  Start sudafed otc  Tylenol and motrin for pain or fever  Follow up with Primary Care Physician  Return to clinic or go to the nearest Emergency Department with new or worsening symptoms as discussed  Sinusitis   WHAT YOU NEED TO KNOW:   Sinusitis is inflammation or infection of your sinuses  Sinusitis is most often caused by a virus  Acute sinusitis may last up to 12 weeks  Chronic sinusitis lasts longer than 12 weeks  Recurrent sinusitis means you have 4 or more infections in 1 year  DISCHARGE INSTRUCTIONS:   Return to the emergency department if:   You have trouble breathing or wheezing that is getting worse  You have a stiff neck, a fever, or a bad headache  You cannot open your eye  Your eyeball bulges out or you cannot move your eye  You are more sleepy than normal, or you notice changes in your ability to think, move, or talk  You have swelling of your forehead or scalp  Call your doctor if:   You have vision changes, such as double vision  Your eye and eyelid are red, swollen, and painful  Your symptoms do not improve or go away after 10 days  You have nausea and are vomiting  Your nose is bleeding  You have questions or concerns about your condition or care  Medicines: Your symptoms may go away on their own  Your healthcare provider may recommend watchful waiting for up to 10 days before starting antibiotics  You may need any of the following:  Acetaminophen  decreases pain and fever  It is available without a doctor's order  Ask how much to take and how often to take it  Follow directions  Read the labels of all other medicines you are using to see if they also contain acetaminophen, or ask your doctor or pharmacist  Acetaminophen can cause liver damage if not taken correctly  Do not use more than 4 grams (4,000 milligrams) total of acetaminophen in one day       NSAIDs , such as ibuprofen, help decrease swelling, pain, and fever  This medicine is available with or without a doctor's order  NSAIDs can cause stomach bleeding or kidney problems in certain people  If you take blood thinner medicine, always ask your healthcare provider if NSAIDs are safe for you  Always read the medicine label and follow directions  Nasal steroid sprays  may help decrease inflammation in your nose and sinuses  Decongestants  help reduce swelling and drain mucus in the nose and sinuses  They may help you breathe easier  Antihistamines  help dry mucus in the nose and relieve sneezing  Antibiotics  help treat or prevent a bacterial infection  Take your medicine as directed  Contact your healthcare provider if you think your medicine is not helping or if you have side effects  Tell him or her if you are allergic to any medicine  Keep a list of the medicines, vitamins, and herbs you take  Include the amounts, and when and why you take them  Bring the list or the pill bottles to follow-up visits  Carry your medicine list with you in case of an emergency  Self-care:   Rinse your sinuses as directed  Use a sinus rinse device to rinse your nasal passages with a saline (salt water) solution or distilled water  Do not use tap water  This will help thin the mucus in your nose and rinse away pollen and dirt  It will also help reduce swelling so you can breathe normally  Use a humidifier  to increase air moisture in your home  This may make it easier for you to breathe and help decrease your cough  Sleep with your head elevated  Place an extra pillow under your head before you go to sleep to help your sinuses drain  Drink liquids as directed  Ask your healthcare provider how much liquid to drink each day and which liquids are best for you  Liquids will thin the mucus in your nose and help it drain  Avoid drinks that contain alcohol or caffeine  Do not smoke, and avoid secondhand smoke    Nicotine and other chemicals in cigarettes and cigars can make your symptoms worse  Ask your healthcare provider for information if you currently smoke and need help to quit  E-cigarettes or smokeless tobacco still contain nicotine  Talk to your healthcare provider before you use these products  Prevent the spread of germs:   Wash your hands often with soap and water  Wash your hands after you use the bathroom, change a child's diaper, or sneeze  Wash your hands before you prepare or eat food  Stay away from people who are sick  Some germs spread easily and quickly through contact  Follow up with your doctor as directed: You may be referred to an ear, nose, and throat specialist  Write down your questions so you remember to ask them during your visits  © Copyright [address] 2022 Information is for End User's use only and may not be sold, redistributed or otherwise used for commercial purposes  All illustrations and images included in CareNotes® are the copyrighted property of A D A M , Inc  or [facility] Bijal Nguyen   The above information is an  only  It is not intended as medical advice for individual conditions or treatments  Talk to your doctor, nurse or pharmacist before following any medical regimen to see if it is safe and effective for you

## 2023-02-09 ENCOUNTER — APPOINTMENT (OUTPATIENT)
Dept: LAB | Facility: CLINIC | Age: 63
End: 2023-02-09

## 2023-02-09 DIAGNOSIS — Z79.899 ENCOUNTER FOR LONG-TERM (CURRENT) USE OF OTHER MEDICATIONS: ICD-10-CM

## 2023-02-09 DIAGNOSIS — E55.9 VITAMIN D DEFICIENCY: ICD-10-CM

## 2023-02-09 DIAGNOSIS — M81.0 OSTEOPOROSIS, UNSPECIFIED OSTEOPOROSIS TYPE, UNSPECIFIED PATHOLOGICAL FRACTURE PRESENCE: ICD-10-CM

## 2023-02-09 DIAGNOSIS — R63.5 WEIGHT GAIN: ICD-10-CM

## 2023-02-09 DIAGNOSIS — E78.5 HYPERLIPIDEMIA, UNSPECIFIED HYPERLIPIDEMIA TYPE: ICD-10-CM

## 2023-02-09 LAB
25(OH)D3 SERPL-MCNC: 24.8 NG/ML (ref 30–100)
ALBUMIN SERPL BCP-MCNC: 3.6 G/DL (ref 3.5–5)
ALP SERPL-CCNC: 69 U/L (ref 46–116)
ALT SERPL W P-5'-P-CCNC: 15 U/L (ref 12–78)
ANION GAP SERPL CALCULATED.3IONS-SCNC: 3 MMOL/L (ref 4–13)
AST SERPL W P-5'-P-CCNC: 15 U/L (ref 5–45)
BILIRUB SERPL-MCNC: 0.24 MG/DL (ref 0.2–1)
BUN SERPL-MCNC: 15 MG/DL (ref 5–25)
CALCIUM SERPL-MCNC: 9.3 MG/DL (ref 8.3–10.1)
CHLORIDE SERPL-SCNC: 108 MMOL/L (ref 96–108)
CHOLEST SERPL-MCNC: 208 MG/DL
CO2 SERPL-SCNC: 28 MMOL/L (ref 21–32)
CREAT SERPL-MCNC: 0.76 MG/DL (ref 0.6–1.3)
ERYTHROCYTE [DISTWIDTH] IN BLOOD BY AUTOMATED COUNT: 13.6 % (ref 11.6–15.1)
GFR SERPL CREATININE-BSD FRML MDRD: 84 ML/MIN/1.73SQ M
GLUCOSE P FAST SERPL-MCNC: 95 MG/DL (ref 65–99)
HCT VFR BLD AUTO: 40.9 % (ref 34.8–46.1)
HDLC SERPL-MCNC: 71 MG/DL
HGB BLD-MCNC: 13 G/DL (ref 11.5–15.4)
LDLC SERPL CALC-MCNC: 121 MG/DL (ref 0–100)
MCH RBC QN AUTO: 30.6 PG (ref 26.8–34.3)
MCHC RBC AUTO-ENTMCNC: 31.8 G/DL (ref 31.4–37.4)
MCV RBC AUTO: 96 FL (ref 82–98)
NONHDLC SERPL-MCNC: 137 MG/DL
PLATELET # BLD AUTO: 382 THOUSANDS/UL (ref 149–390)
PMV BLD AUTO: 10.7 FL (ref 8.9–12.7)
POTASSIUM SERPL-SCNC: 4.2 MMOL/L (ref 3.5–5.3)
PROT SERPL-MCNC: 7.4 G/DL (ref 6.4–8.4)
RBC # BLD AUTO: 4.25 MILLION/UL (ref 3.81–5.12)
SODIUM SERPL-SCNC: 139 MMOL/L (ref 135–147)
T4 FREE SERPL-MCNC: 0.84 NG/DL (ref 0.76–1.46)
TRIGL SERPL-MCNC: 80 MG/DL
TSH SERPL DL<=0.05 MIU/L-ACNC: 1.88 UIU/ML (ref 0.45–4.5)
WBC # BLD AUTO: 9.28 THOUSAND/UL (ref 4.31–10.16)

## 2023-02-24 ENCOUNTER — HOSPITAL ENCOUNTER (OUTPATIENT)
Dept: MAMMOGRAPHY | Facility: HOSPITAL | Age: 63
Discharge: HOME | End: 2023-02-24
Attending: INTERNAL MEDICINE

## 2023-02-24 VITALS — WEIGHT: 180 LBS | BODY MASS INDEX: 26.66 KG/M2 | HEIGHT: 69 IN

## 2023-02-24 DIAGNOSIS — Z12.31 ENCOUNTER FOR SCREENING MAMMOGRAM FOR MALIGNANT NEOPLASM OF BREAST: ICD-10-CM

## 2023-12-12 ENCOUNTER — OFFICE VISIT (OUTPATIENT)
Dept: URGENT CARE | Facility: CLINIC | Age: 63
End: 2023-12-12
Payer: COMMERCIAL

## 2023-12-12 VITALS
TEMPERATURE: 98.6 F | DIASTOLIC BLOOD PRESSURE: 72 MMHG | OXYGEN SATURATION: 95 % | RESPIRATION RATE: 18 BRPM | HEART RATE: 95 BPM | SYSTOLIC BLOOD PRESSURE: 137 MMHG

## 2023-12-12 DIAGNOSIS — H66.91 RIGHT OTITIS MEDIA, UNSPECIFIED OTITIS MEDIA TYPE: Primary | ICD-10-CM

## 2023-12-12 PROCEDURE — 99213 OFFICE O/P EST LOW 20 MIN: CPT | Performed by: PHYSICIAN ASSISTANT

## 2023-12-12 RX ORDER — ROSUVASTATIN CALCIUM 5 MG/1
TABLET, COATED ORAL
COMMUNITY
Start: 2023-12-02

## 2023-12-12 RX ORDER — AMOXICILLIN 875 MG/1
875 TABLET, COATED ORAL 2 TIMES DAILY
Qty: 20 TABLET | Refills: 0 | Status: SHIPPED | OUTPATIENT
Start: 2023-12-12 | End: 2023-12-22

## 2023-12-12 RX ORDER — OFLOXACIN 3 MG/ML
10 SOLUTION AURICULAR (OTIC) 2 TIMES DAILY
Qty: 5 ML | Refills: 0 | Status: SHIPPED | OUTPATIENT
Start: 2023-12-12 | End: 2023-12-17

## 2023-12-12 NOTE — PROGRESS NOTES
[facility]    NAME: Abimbola Cisneros is a 61 y.o. female  : 1960    MRN: [id number]  DATE: 2023  TIME: 2:36 PM    Assessment and Plan   Right otitis media, unspecified otitis media type [H66.91]  1. Right otitis media, unspecified otitis media type  amoxicillin (AMOXIL) 875 mg tablet    ofloxacin (FLOXIN) 0.3 % otic solution          Patient Instructions     Patient Instructions   I have prescribed an antibiotic for the infection. Please take the antibiotic as prescribed and finish the entire prescription. I recommend that the patient takes an over the counter probiotic or eats yogurt with live cultures in it [place]) to keep good bacteria in the gut and help prevent diarrhea. Wash hands frequently to prevent the spread of infection. Can use over the counter cough and cold medications to help with symptoms. Ibuprofen and/or tylenol as needed for pain or fever. If not improving over the next 7-10 days, follow up with PCP. Chief Complaint     Chief Complaint   Patient presents with    Earache     Right ear blocked , pain connected to throat, left ear crackling     Sore Throat     X 3 days        History of Present Illness   Earache   There is pain in both ears. This is a recurrent problem. The current episode started in the past 7 days. The problem occurs constantly. The problem has been rapidly worsening. There has been no fever. The pain is at a severity of 5/10. Associated symptoms include coughing, hearing loss, rhinorrhea and a sore throat. Pertinent negatives include no abdominal pain, ear discharge, headaches, neck pain, rash or vomiting. Sore Throat   Associated symptoms include coughing and ear pain. Pertinent negatives include no abdominal pain, ear discharge, headaches, neck pain or vomiting. Review of Systems   Review of Systems   HENT:  Positive for ear pain, hearing loss, rhinorrhea and sore throat. Negative for ear discharge.     Respiratory:  Positive for cough. Gastrointestinal:  Negative for abdominal pain and vomiting. Musculoskeletal:  Negative for neck pain. Skin:  Negative for rash. Neurological:  Negative for headaches.        Current Medications     Current Outpatient Medications:     amoxicillin (AMOXIL) 875 mg tablet, Take 1 tablet (875 mg total) by mouth 2 (two) times a day for 10 days, Disp: 20 tablet, Rfl: 0    ofloxacin (FLOXIN) 0.3 % otic solution, Administer 10 drops to the right ear 2 (two) times a day for 5 days, Disp: 5 mL, Rfl: 0    cyclobenzaprine (FLEXERIL) 10 mg tablet, Take 1 tablet (10 mg total) by mouth 3 (three) times a day as needed for muscle spasms (Patient not taking: Reported on 2/23/2023), Disp: 30 tablet, Rfl: 0    fluticasone (FLONASE) 50 mcg/act nasal spray, 1 spray into each nostril 2 (two) times a day as needed for rhinitis, Disp: 9.9 mL, Rfl: 0    fluticasone (FLONASE) 50 mcg/act nasal spray, 2 sprays into each nostril daily, Disp: 15.8 mL, Rfl: 11    ketorolac (TORADOL) 10 mg tablet, Take 1 tablet (10 mg total) by mouth every 6 (six) hours as needed for moderate pain (Patient not taking: Reported on 4/26/2019), Disp: 20 tablet, Rfl: 0    Multiple Vitamin (MULTIVITAMIN) tablet, Take 1 tablet by mouth daily (Patient not taking: Reported on 8/24/2021), Disp: , Rfl:     predniSONE 10 mg tablet, Take 3 tabs BID X 2 days, 2 tabs BID X 2 days, 1 tab BID X 2 days, 1 tab daily X 2 days (Patient not taking: Reported on 12/28/2022), Disp: 26 tablet, Rfl: 0    rosuvastatin (CRESTOR) 5 mg tablet, , Disp: , Rfl:     Current Allergies     Allergies as of 12/12/2023    (No Known Allergies)          The following portions of the patient's history were reviewed and updated as appropriate: allergies, current medications, past family history, past medical history, past social history, past surgical history and problem list.   Past Medical History:   Diagnosis Date    Osteoporosis      Past Surgical History:   Procedure Laterality Date BACK SURGERY      repair disk    OVARIAN CYST REMOVAL Right 1992    TUBAL LIGATION  1995    WISDOM TOOTH EXTRACTION       Family History   Problem Relation Age of Onset    Hypertension Mother     Lung cancer Father 66    Hypertension Father     Breast cancer Sister 61    Cervical cancer Sister         25s    Breast cancer Other 29    No Known Problems Daughter     Breast cancer Maternal Grandmother 61    No Known Problems Daughter     Cervical cancer Other 28    Breast cancer Other 45    Breast cancer Maternal Aunt     No Known Problems Maternal Aunt     No Known Problems Maternal Aunt     No Known Problems Paternal Aunt     No Known Problems Paternal Aunt     No Known Problems Paternal Aunt      Social History     Socioeconomic History    Marital status: /Civil Union     Spouse name: Not on file    Number of children: Not on file    Years of education: Not on file    Highest education level: Not on file   Occupational History    Not on file   Tobacco Use    Smoking status: Former     Packs/day: 0.20     Types: Cigarettes    Smokeless tobacco: Never   Vaping Use    Vaping Use: Never used   Substance and Sexual Activity    Alcohol use: Yes     Comment: occasional     Drug use: Never    Sexual activity: Not Currently     Partners: Male     Birth control/protection: None   Other Topics Concern    Not on file   Social History Narrative    Not on file     Social Determinants of Health     Financial Resource Strain: Not on file   Food Insecurity: Not on file   Transportation Needs: Not on file   Physical Activity: Not on file   Stress: Not on file   Social Connections: Not on file   Intimate Partner Violence: Not on file   Housing Stability: Not on file     Medications have been verified. Objective   /72   Pulse 95   Temp 98.6 °F (37 °C)   Resp 18   SpO2 95%      Physical Exam   Physical Exam  Vitals and nursing note reviewed. Constitutional:       General: She is not in acute distress. Appearance: She is well-developed. HENT:      Head: Normocephalic and atraumatic. Right Ear: Tympanic membrane is erythematous. Left Ear: Tympanic membrane normal.      Nose: Congestion and rhinorrhea present. No mucosal edema. Right Sinus: No maxillary sinus tenderness or frontal sinus tenderness. Left Sinus: No maxillary sinus tenderness or frontal sinus tenderness. Mouth/Throat:      Pharynx: Posterior oropharyngeal erythema present. No oropharyngeal exudate. Eyes:      Conjunctiva/sclera: Conjunctivae normal.   Cardiovascular:      Rate and Rhythm: Normal rate and regular rhythm. Heart sounds: Normal heart sounds. No murmur heard.

## 2024-05-09 ENCOUNTER — OFFICE VISIT (OUTPATIENT)
Dept: URGENT CARE | Facility: CLINIC | Age: 64
End: 2024-05-09
Payer: COMMERCIAL

## 2024-05-09 VITALS
SYSTOLIC BLOOD PRESSURE: 158 MMHG | RESPIRATION RATE: 18 BRPM | TEMPERATURE: 98.3 F | HEART RATE: 83 BPM | DIASTOLIC BLOOD PRESSURE: 69 MMHG | OXYGEN SATURATION: 95 %

## 2024-05-09 DIAGNOSIS — M25.512 ACUTE PAIN OF LEFT SHOULDER: Primary | ICD-10-CM

## 2024-05-09 DIAGNOSIS — M62.830 MUSCLE SPASM OF BACK: ICD-10-CM

## 2024-05-09 LAB
ATRIAL RATE: 77 BPM
P AXIS: 69 DEGREES
PR INTERVAL: 138 MS
QRS AXIS: 9 DEGREES
QRSD INTERVAL: 74 MS
QT INTERVAL: 396 MS
QTC INTERVAL: 448 MS
T WAVE AXIS: 42 DEGREES
VENTRICULAR RATE: 77 BPM

## 2024-05-09 PROCEDURE — 99213 OFFICE O/P EST LOW 20 MIN: CPT | Performed by: PHYSICIAN ASSISTANT

## 2024-05-09 PROCEDURE — 93005 ELECTROCARDIOGRAM TRACING: CPT | Performed by: PHYSICIAN ASSISTANT

## 2024-05-09 PROCEDURE — 93010 ELECTROCARDIOGRAM REPORT: CPT | Performed by: INTERNAL MEDICINE

## 2024-05-09 RX ORDER — KETOROLAC TROMETHAMINE 10 MG/1
10 TABLET, FILM COATED ORAL EVERY 8 HOURS
Qty: 15 TABLET | Refills: 0 | Status: SHIPPED | OUTPATIENT
Start: 2024-05-09 | End: 2024-05-14

## 2024-05-09 RX ORDER — LIDOCAINE 50 MG/G
1 PATCH TOPICAL DAILY
Qty: 30 PATCH | Refills: 0 | Status: SHIPPED | OUTPATIENT
Start: 2024-05-09

## 2024-05-09 RX ORDER — METHYLPREDNISOLONE 4 MG/1
TABLET ORAL
Qty: 1 EACH | Refills: 0 | Status: SHIPPED | OUTPATIENT
Start: 2024-05-09

## 2024-05-09 NOTE — PROGRESS NOTES
Assessment/Plan    Acute pain of left shoulder [M25.512]  1. Acute pain of left shoulder  methylPREDNISolone 4 MG tablet therapy pack    ketorolac (TORADOL) 10 mg tablet    lidocaine (Lidoderm) 5 %      2. Muscle spasm of back  lidocaine (Lidoderm) 5 %            Subjective:     Patient ID: Abimbola De Jesus is a 64 y.o. female.      Reason For Visit / Chief Complaint  Chief Complaint   Patient presents with    Back Pain     Left back into neck and shoulder down left arm x 2 days          Patient is a 64 year-old female with a history of bilateral shoulder fracture and rotator cuff pathology presenting with left scapular pain that began 2 days ago. The pain radiates down her left arm. She cannot raise her arm above her shoulder. She has taken Aleve with minimal pain improvement. Last imaging of her left shoulder is an x-ray from 2 years ago that showed chronic rotator cuff pathology.    Patient has been helping out with a sale the past few days, denies any specific injury.  Pt began to walk daily 1 week ago as well.  Pt walks 4 miles daily.      Back Pain  This is a new problem. The current episode started in the past 7 days. The problem occurs constantly. The problem has been gradually worsening since onset. Pertinent negatives include no abdominal pain, chest pain, dysuria or fever.         Past Medical History:   Diagnosis Date    Osteoporosis        Past Surgical History:   Procedure Laterality Date    BACK SURGERY      repair disk    OVARIAN CYST REMOVAL Right 1992    TUBAL LIGATION  1995    WISDOM TOOTH EXTRACTION         Family History   Problem Relation Age of Onset    Hypertension Mother     Lung cancer Father 78    Hypertension Father     Breast cancer Sister 59    Cervical cancer Sister         20s    Breast cancer Other 34    No Known Problems Daughter     Breast cancer Maternal Grandmother 59    No Known Problems Daughter     Cervical cancer Other 32    Breast cancer Other 38    Breast cancer Maternal  Aunt     No Known Problems Maternal Aunt     No Known Problems Maternal Aunt     No Known Problems Paternal Aunt     No Known Problems Paternal Aunt     No Known Problems Paternal Aunt        Review of Systems   Constitutional:  Negative for chills and fever.   HENT:  Negative for ear pain and sore throat.    Eyes:  Negative for pain and visual disturbance.   Respiratory:  Negative for cough and shortness of breath.    Cardiovascular:  Negative for chest pain and palpitations.   Gastrointestinal:  Negative for abdominal pain and vomiting.   Genitourinary:  Negative for dysuria and hematuria.   Musculoskeletal:  Positive for arthralgias, back pain and neck pain.   Skin:  Negative for color change and rash.   Neurological:  Negative for seizures and syncope.   All other systems reviewed and are negative.      Objective:    /69   Pulse 83   Temp 98.3 °F (36.8 °C)   Resp 18   SpO2 95%     Physical Exam  Constitutional:       General: She is not in acute distress.     Appearance: Normal appearance. She is normal weight. She is not ill-appearing, toxic-appearing or diaphoretic.   HENT:      Head: Normocephalic and atraumatic.      Nose: Nose normal.      Mouth/Throat:      Mouth: Mucous membranes are moist.   Eyes:      Extraocular Movements: Extraocular movements intact.      Conjunctiva/sclera: Conjunctivae normal.      Pupils: Pupils are equal, round, and reactive to light.   Cardiovascular:      Rate and Rhythm: Normal rate and regular rhythm.      Heart sounds: No murmur heard.     No friction rub. No gallop.   Pulmonary:      Effort: Pulmonary effort is normal.      Breath sounds: No wheezing, rhonchi or rales.   Musculoskeletal:      Right shoulder: No tenderness. Normal range of motion.      Left shoulder: Tenderness present. Decreased range of motion.      Cervical back: Normal range of motion and neck supple.        Back:    Skin:     General: Skin is warm and dry.      Capillary Refill: Capillary  refill takes less than 2 seconds.   Neurological:      General: No focal deficit present.      Mental Status: She is alert and oriented to person, place, and time.   Psychiatric:         Mood and Affect: Mood normal.         Behavior: Behavior normal.

## 2024-07-03 ENCOUNTER — HOSPITAL ENCOUNTER (OUTPATIENT)
Dept: MAMMOGRAPHY | Facility: HOSPITAL | Age: 64
Discharge: HOME | End: 2024-07-03
Payer: COMMERCIAL

## 2024-07-03 ENCOUNTER — HOSPITAL ENCOUNTER (OUTPATIENT)
Dept: BONE DENSITY | Facility: HOSPITAL | Age: 64
Discharge: HOME | End: 2024-07-03
Payer: COMMERCIAL

## 2024-07-03 VITALS — HEIGHT: 69 IN | WEIGHT: 175 LBS | BODY MASS INDEX: 25.92 KG/M2

## 2024-07-03 DIAGNOSIS — M81.0 OSTEOPOROSIS, UNSPECIFIED OSTEOPOROSIS TYPE, UNSPECIFIED PATHOLOGICAL FRACTURE PRESENCE: ICD-10-CM

## 2024-07-03 DIAGNOSIS — Z12.31 ENCOUNTER FOR SCREENING MAMMOGRAM FOR MALIGNANT NEOPLASM OF BREAST: ICD-10-CM

## 2024-07-03 PROCEDURE — 77063 BREAST TOMOSYNTHESIS BI: CPT

## 2024-07-03 PROCEDURE — 77067 SCR MAMMO BI INCL CAD: CPT

## 2024-07-03 PROCEDURE — 77080 DXA BONE DENSITY AXIAL: CPT

## 2024-07-20 ENCOUNTER — APPOINTMENT (OUTPATIENT)
Dept: LAB | Facility: CLINIC | Age: 64
End: 2024-07-20
Payer: COMMERCIAL

## 2024-07-20 DIAGNOSIS — Z79.899 ENCOUNTER FOR LONG-TERM (CURRENT) USE OF OTHER MEDICATIONS: Primary | ICD-10-CM

## 2024-07-20 DIAGNOSIS — R53.83 OTHER FATIGUE: ICD-10-CM

## 2024-07-20 DIAGNOSIS — E55.9 VITAMIN D DEFICIENCY: ICD-10-CM

## 2024-07-20 DIAGNOSIS — E78.5 HYPERLIPIDEMIA, UNSPECIFIED HYPERLIPIDEMIA TYPE: ICD-10-CM

## 2024-07-20 DIAGNOSIS — R63.5 WEIGHT GAIN: ICD-10-CM

## 2024-07-20 DIAGNOSIS — R73.9 HYPERGLYCEMIA: ICD-10-CM

## 2024-07-20 LAB
25(OH)D3 SERPL-MCNC: 34.1 NG/ML (ref 30–100)
ALBUMIN SERPL BCG-MCNC: 4 G/DL (ref 3.5–5)
ALP SERPL-CCNC: 46 U/L (ref 34–104)
ALT SERPL W P-5'-P-CCNC: 19 U/L (ref 7–52)
ANION GAP SERPL CALCULATED.3IONS-SCNC: 10 MMOL/L (ref 4–13)
AST SERPL W P-5'-P-CCNC: 21 U/L (ref 13–39)
BILIRUB SERPL-MCNC: 0.41 MG/DL (ref 0.2–1)
BUN SERPL-MCNC: 10 MG/DL (ref 5–25)
CALCIUM SERPL-MCNC: 9.1 MG/DL (ref 8.4–10.2)
CHLORIDE SERPL-SCNC: 107 MMOL/L (ref 96–108)
CHOLEST SERPL-MCNC: 182 MG/DL
CO2 SERPL-SCNC: 26 MMOL/L (ref 21–32)
CREAT SERPL-MCNC: 0.72 MG/DL (ref 0.6–1.3)
ERYTHROCYTE [DISTWIDTH] IN BLOOD BY AUTOMATED COUNT: 13.6 % (ref 11.6–15.1)
EST. AVERAGE GLUCOSE BLD GHB EST-MCNC: 131 MG/DL
GFR SERPL CREATININE-BSD FRML MDRD: 88 ML/MIN/1.73SQ M
GLUCOSE P FAST SERPL-MCNC: 89 MG/DL (ref 65–99)
HBA1C MFR BLD: 6.2 %
HCT VFR BLD AUTO: 41.4 % (ref 34.8–46.1)
HDLC SERPL-MCNC: 58 MG/DL
HGB BLD-MCNC: 13.7 G/DL (ref 11.5–15.4)
LDLC SERPL CALC-MCNC: 94 MG/DL (ref 0–100)
MCH RBC QN AUTO: 32.2 PG (ref 26.8–34.3)
MCHC RBC AUTO-ENTMCNC: 33.1 G/DL (ref 31.4–37.4)
MCV RBC AUTO: 97 FL (ref 82–98)
NONHDLC SERPL-MCNC: 124 MG/DL
PLATELET # BLD AUTO: 290 THOUSANDS/UL (ref 149–390)
PMV BLD AUTO: 11.1 FL (ref 8.9–12.7)
POTASSIUM SERPL-SCNC: 4.2 MMOL/L (ref 3.5–5.3)
PROT SERPL-MCNC: 6.7 G/DL (ref 6.4–8.4)
RBC # BLD AUTO: 4.26 MILLION/UL (ref 3.81–5.12)
SODIUM SERPL-SCNC: 143 MMOL/L (ref 135–147)
T4 FREE SERPL-MCNC: 0.76 NG/DL (ref 0.61–1.12)
TRIGL SERPL-MCNC: 152 MG/DL
TSH SERPL DL<=0.05 MIU/L-ACNC: 1.97 UIU/ML (ref 0.45–4.5)
WBC # BLD AUTO: 8.31 THOUSAND/UL (ref 4.31–10.16)

## 2024-07-20 PROCEDURE — 84439 ASSAY OF FREE THYROXINE: CPT

## 2024-07-20 PROCEDURE — 84443 ASSAY THYROID STIM HORMONE: CPT

## 2024-07-20 PROCEDURE — 80061 LIPID PANEL: CPT

## 2024-07-20 PROCEDURE — 83036 HEMOGLOBIN GLYCOSYLATED A1C: CPT

## 2024-07-20 PROCEDURE — 82306 VITAMIN D 25 HYDROXY: CPT

## 2024-07-20 PROCEDURE — 36415 COLL VENOUS BLD VENIPUNCTURE: CPT

## 2024-07-20 PROCEDURE — 85027 COMPLETE CBC AUTOMATED: CPT

## 2024-07-20 PROCEDURE — 80053 COMPREHEN METABOLIC PANEL: CPT

## 2024-10-23 ENCOUNTER — OFFICE VISIT (OUTPATIENT)
Dept: OBGYN CLINIC | Facility: CLINIC | Age: 64
End: 2024-10-23
Payer: COMMERCIAL

## 2024-10-23 VITALS
SYSTOLIC BLOOD PRESSURE: 120 MMHG | BODY MASS INDEX: 25.98 KG/M2 | DIASTOLIC BLOOD PRESSURE: 74 MMHG | HEIGHT: 69 IN | WEIGHT: 175.4 LBS

## 2024-10-23 DIAGNOSIS — Z01.419 ENCOUNTER FOR ROUTINE GYNECOLOGICAL EXAMINATION WITH PAPANICOLAOU SMEAR OF CERVIX: Primary | ICD-10-CM

## 2024-10-23 DIAGNOSIS — Z12.31 ENCOUNTER FOR SCREENING MAMMOGRAM FOR MALIGNANT NEOPLASM OF BREAST: ICD-10-CM

## 2024-10-23 PROCEDURE — G0145 SCR C/V CYTO,THINLAYER,RESCR: HCPCS | Performed by: OBSTETRICS & GYNECOLOGY

## 2024-10-23 PROCEDURE — G0476 HPV COMBO ASSAY CA SCREEN: HCPCS | Performed by: OBSTETRICS & GYNECOLOGY

## 2024-10-23 PROCEDURE — S0610 ANNUAL GYNECOLOGICAL EXAMINA: HCPCS | Performed by: OBSTETRICS & GYNECOLOGY

## 2024-10-23 NOTE — PROGRESS NOTES
ASSESSMENT & PLAN: Abimbola was seen today for establish care and gynecologic exam.    Diagnoses and all orders for this visit:    Encounter for routine gynecological examination with Papanicolaou smear of cervix    Encounter for screening mammogram for malignant neoplasm of breast  -     Mammo screening bilateral w 3d and cad; Future        1.  Routine well woman exam done today  2.  Pap and HPV:  The patient's pap is not up to date.  Pap and cotesting was done today.  Current ASCCP Guidelines reviewed.  3.  Mammogram was ordered  4.  Colorectal cancer screening is up to date.    4. The following were reviewed in today's visit: adequate intake of calcium and vitamin D, exercise, and healthy diet.  5. F/u 1 year for next routine gyn exam.      CC:  Annual Gynecologic Examination    HPI: Abimbola De Jesus is a 64 y.o.  who presents for annual gynecologic examination.  She has the following concerns:  no c/o.  Dealing with skin cancer issues.  Had melanoma once.  Has had a total of 19 skin procedures.  Has had basal cell.    Health Maintenance:    Patient describes her health as good.  Patient has weight concerns.  Has lost weight; wants to be 160-165 lbs.   She exercises 4 days per week with walking.    She does wear her seatbelt routinely.    She does not perform regular monthly self breast exams.    She does feel safe at home.   Patients does follow a healthy diet.  Patient gets 1 servings of dairy or calcium rich foods a day.    Last pap:  nl pap and neg HPV  Last mammogram:   Last colorectal cancer screening: colonoscopy yearly; sister passed away from colon cancer; pt has personal history of polyps  Last DEXA:     There is no problem list on file for this patient.      Past Medical History:   Diagnosis Date    Osteoporosis        Past Surgical History:   Procedure Laterality Date    BACK SURGERY      repair disk    OVARIAN CYST REMOVAL Right     TUBAL LIGATION      WISDOM TOOTH EXTRACTION          Past OB/Gyn History:    History of abnormal pap smears: Yes. Twice in the past 15 years.  No procedures.  Patient is currently sexually active.  heterosexual. Patient's family planning method is post menopausal status.    Family History   Problem Relation Age of Onset    Hypertension Mother     Lung cancer Father 78    Hypertension Father     Breast cancer Sister 59    Cervical cancer Sister         20s    Breast cancer Other 34    No Known Problems Daughter     Breast cancer Maternal Grandmother 59    No Known Problems Daughter     Cervical cancer Other 32    Breast cancer Other 38    Breast cancer Maternal Aunt     No Known Problems Maternal Aunt     No Known Problems Maternal Aunt     No Known Problems Paternal Aunt     No Known Problems Paternal Aunt     No Known Problems Paternal Aunt        Social History:  Social History     Socioeconomic History    Marital status: /Civil Union     Spouse name: Not on file    Number of children: Not on file    Years of education: Not on file    Highest education level: Not on file   Occupational History    Not on file   Tobacco Use    Smoking status: Former     Current packs/day: 0.20     Types: Cigarettes    Smokeless tobacco: Never   Vaping Use    Vaping status: Never Used   Substance and Sexual Activity    Alcohol use: Yes     Comment: occasional     Drug use: Never    Sexual activity: Yes     Partners: Male     Birth control/protection: None   Other Topics Concern    Not on file   Social History Narrative    Not on file     Social Determinants of Health     Financial Resource Strain: Not on file   Food Insecurity: Not on file   Transportation Needs: Not on file   Physical Activity: Not on file   Stress: Not on file   Social Connections: Not on file   Intimate Partner Violence: Not on file   Housing Stability: Not on file     Presently lives with .  Patient is currently employed retired; was in banking for 40 years.    No Known Allergies      Current  "Outpatient Medications:     Cholecalciferol (VITAMIN D-3 PO), Take by mouth, Disp: , Rfl:     Multiple Vitamin (MULTIVITAMIN) tablet, Take 1 tablet by mouth daily, Disp: , Rfl:     Review of Systems  Constitutional :no fever, feels well, no tiredness, no recent weight gain or loss  ENT: no ear ache, no loss of hearing, no nosebleeds or nasal discharge, no sore throat or hoarseness.  Cardiovascular: no complaints of slow or fast heart beat, no chest pain, no palpitations, no leg claudication or lower extremity edema.  Respiratory: no complaints of shortness of shortness of breath, no RESENDIZ  Breasts:no complaints of breast pain, breast lump, or nipple discharge  Gastrointestinal: no complaints of abdominal pain, constipation, nausea, vomiting, or diarrhea or bloody stools  Genitourinary : no complaints of dysuria, incontinence, pelvic pain, no dysmenorrhea, vaginal discharge or abnormal vaginal bleeding and as noted in HPI.  Musculoskeletal: no complaints of arthralgia, no myalgia, no joint swelling or stiffness, no limb pain or swelling.  Integumentary: no complaints of skin rash or lesion, itching or dry skin  Neurological: no complaints of headache, no confusion, no numbness or tingling, no dizziness or fainting    Physical Exam:     /74   Ht 5' 9\" (1.753 m)   Wt 79.6 kg (175 lb 6.4 oz)   BMI 25.90 kg/m²     General: appears stated age, cooperative, alert normal mood and affect   Psychiatric oriented to person, place and time.  Mood and affect normal   Neck: normal, supple,trachea midline, no masses.  Thyroid: normal, no thyromegaly   Heart: regular rate and rhythm, S1, S2 normal, no murmur, click, rub or gallop   Lungs: clear to auscultation bilaterally, no increased work of breathing or signs of respiratory distress   Breasts: normal, no dimpling or skin changes noted, sm f-c changes upper quad b/l   Abdomen: soft, non-tender, without masses or organomegaly   Vulva: normal , no lesions   Vagina: normal , " no lesions, mild atrophy   Urethra: normal   Urethal meatus normal   Bladder Normal, soft, non-tender and no prolapse or masses appreciated   Cervix: normal, no palpable masses    Uterus: normal , non-tender, not enlarged, no palpable masses   Adnexa: normal, non-tender without fullness or masses   Lymphatic Palpation of lymph nodes in neck, axilla, groin and/or other locations: no lymphadenopathy or masses noted   Skin Normal skin turgor and no rashes.  Palpation of skin and subcutaneous tissue normal.

## 2024-10-24 LAB
HPV HR 12 DNA CVX QL NAA+PROBE: NEGATIVE
HPV16 DNA CVX QL NAA+PROBE: NEGATIVE
HPV18 DNA CVX QL NAA+PROBE: NEGATIVE

## 2024-10-30 LAB
LAB AP GYN PRIMARY INTERPRETATION: NORMAL
Lab: NORMAL

## 2025-01-25 ENCOUNTER — APPOINTMENT (OUTPATIENT)
Dept: LAB | Facility: CLINIC | Age: 65
End: 2025-01-25
Payer: COMMERCIAL

## 2025-01-25 DIAGNOSIS — E78.5 HYPERLIPIDEMIA, UNSPECIFIED HYPERLIPIDEMIA TYPE: ICD-10-CM

## 2025-01-25 DIAGNOSIS — R73.9 HYPERGLYCEMIA: ICD-10-CM

## 2025-01-25 DIAGNOSIS — E55.9 VITAMIN D DEFICIENCY DISEASE: ICD-10-CM

## 2025-01-25 DIAGNOSIS — Z79.899 ENCOUNTER FOR LONG-TERM (CURRENT) USE OF MEDICATIONS: ICD-10-CM

## 2025-01-25 LAB
25(OH)D3 SERPL-MCNC: 27.6 NG/ML (ref 30–100)
ALBUMIN SERPL BCG-MCNC: 4.1 G/DL (ref 3.5–5)
ALP SERPL-CCNC: 63 U/L (ref 34–104)
ALT SERPL W P-5'-P-CCNC: 14 U/L (ref 7–52)
ANION GAP SERPL CALCULATED.3IONS-SCNC: 7 MMOL/L (ref 4–13)
AST SERPL W P-5'-P-CCNC: 17 U/L (ref 13–39)
BILIRUB SERPL-MCNC: 0.31 MG/DL (ref 0.2–1)
BUN SERPL-MCNC: 20 MG/DL (ref 5–25)
CALCIUM SERPL-MCNC: 9.1 MG/DL (ref 8.4–10.2)
CHLORIDE SERPL-SCNC: 105 MMOL/L (ref 96–108)
CHOLEST SERPL-MCNC: 209 MG/DL (ref ?–200)
CO2 SERPL-SCNC: 30 MMOL/L (ref 21–32)
CREAT SERPL-MCNC: 0.81 MG/DL (ref 0.6–1.3)
EST. AVERAGE GLUCOSE BLD GHB EST-MCNC: 123 MG/DL
GFR SERPL CREATININE-BSD FRML MDRD: 76 ML/MIN/1.73SQ M
GLUCOSE P FAST SERPL-MCNC: 96 MG/DL (ref 65–99)
HBA1C MFR BLD: 5.9 %
HDLC SERPL-MCNC: 65 MG/DL
LDLC SERPL CALC-MCNC: 122 MG/DL (ref 0–100)
NONHDLC SERPL-MCNC: 144 MG/DL
POTASSIUM SERPL-SCNC: 4.3 MMOL/L (ref 3.5–5.3)
PROT SERPL-MCNC: 7.1 G/DL (ref 6.4–8.4)
SODIUM SERPL-SCNC: 142 MMOL/L (ref 135–147)
TRIGL SERPL-MCNC: 112 MG/DL (ref ?–150)

## 2025-01-25 PROCEDURE — 80053 COMPREHEN METABOLIC PANEL: CPT

## 2025-01-25 PROCEDURE — 80061 LIPID PANEL: CPT

## 2025-01-25 PROCEDURE — 82306 VITAMIN D 25 HYDROXY: CPT

## 2025-01-25 PROCEDURE — 36415 COLL VENOUS BLD VENIPUNCTURE: CPT

## 2025-01-25 PROCEDURE — 83036 HEMOGLOBIN GLYCOSYLATED A1C: CPT

## 2025-07-23 ENCOUNTER — APPOINTMENT (OUTPATIENT)
Dept: LAB | Facility: CLINIC | Age: 65
End: 2025-07-23
Attending: INTERNAL MEDICINE
Payer: COMMERCIAL

## 2025-07-23 DIAGNOSIS — R73.9 HYPERGLYCEMIA: ICD-10-CM

## 2025-07-23 DIAGNOSIS — D64.9 ANEMIA, UNSPECIFIED TYPE: ICD-10-CM

## 2025-07-23 DIAGNOSIS — R63.5 WEIGHT GAIN: ICD-10-CM

## 2025-07-23 DIAGNOSIS — E55.9 VITAMIN D DEFICIENCY: ICD-10-CM

## 2025-07-23 DIAGNOSIS — E78.5 HYPERLIPIDEMIA, UNSPECIFIED HYPERLIPIDEMIA TYPE: ICD-10-CM

## 2025-07-23 DIAGNOSIS — Z79.899 ENCOUNTER FOR LONG-TERM (CURRENT) USE OF MEDICATIONS: ICD-10-CM

## 2025-07-23 LAB
25(OH)D3 SERPL-MCNC: 45.4 NG/ML (ref 30–100)
ALBUMIN SERPL BCG-MCNC: 4 G/DL (ref 3.5–5)
ALP SERPL-CCNC: 54 U/L (ref 34–104)
ALT SERPL W P-5'-P-CCNC: 16 U/L (ref 7–52)
ANION GAP SERPL CALCULATED.3IONS-SCNC: 4 MMOL/L (ref 4–13)
AST SERPL W P-5'-P-CCNC: 19 U/L (ref 13–39)
BILIRUB SERPL-MCNC: 0.33 MG/DL (ref 0.2–1)
BUN SERPL-MCNC: 19 MG/DL (ref 5–25)
CALCIUM SERPL-MCNC: 9 MG/DL (ref 8.4–10.2)
CHLORIDE SERPL-SCNC: 109 MMOL/L (ref 96–108)
CHOLEST SERPL-MCNC: 153 MG/DL (ref ?–200)
CO2 SERPL-SCNC: 28 MMOL/L (ref 21–32)
CREAT SERPL-MCNC: 0.78 MG/DL (ref 0.6–1.3)
ERYTHROCYTE [DISTWIDTH] IN BLOOD BY AUTOMATED COUNT: 13.5 % (ref 11.6–15.1)
EST. AVERAGE GLUCOSE BLD GHB EST-MCNC: 134 MG/DL
FERRITIN SERPL-MCNC: 87 NG/ML (ref 30–307)
GFR SERPL CREATININE-BSD FRML MDRD: 80 ML/MIN/1.73SQ M
GLUCOSE P FAST SERPL-MCNC: 93 MG/DL (ref 65–99)
HBA1C MFR BLD: 6.3 %
HCT VFR BLD AUTO: 41.6 % (ref 34.8–46.1)
HDLC SERPL-MCNC: 59 MG/DL
HGB BLD-MCNC: 13.6 G/DL (ref 11.5–15.4)
IRON SATN MFR SERPL: 27 % (ref 15–50)
IRON SERPL-MCNC: 93 UG/DL (ref 50–212)
LDLC SERPL CALC-MCNC: 80 MG/DL (ref 0–100)
MCH RBC QN AUTO: 31.9 PG (ref 26.8–34.3)
MCHC RBC AUTO-ENTMCNC: 32.7 G/DL (ref 31.4–37.4)
MCV RBC AUTO: 97 FL (ref 82–98)
NONHDLC SERPL-MCNC: 94 MG/DL
PLATELET # BLD AUTO: 292 THOUSANDS/UL (ref 149–390)
PMV BLD AUTO: 10.4 FL (ref 8.9–12.7)
POTASSIUM SERPL-SCNC: 4.5 MMOL/L (ref 3.5–5.3)
PROT SERPL-MCNC: 6.7 G/DL (ref 6.4–8.4)
RBC # BLD AUTO: 4.27 MILLION/UL (ref 3.81–5.12)
SODIUM SERPL-SCNC: 141 MMOL/L (ref 135–147)
T4 FREE SERPL-MCNC: 0.78 NG/DL (ref 0.61–1.12)
TIBC SERPL-MCNC: 338.8 UG/DL (ref 250–450)
TRANSFERRIN SERPL-MCNC: 242 MG/DL (ref 203–362)
TRIGL SERPL-MCNC: 70 MG/DL (ref ?–150)
TSH SERPL DL<=0.05 MIU/L-ACNC: 1.93 UIU/ML (ref 0.45–4.5)
UIBC SERPL-MCNC: 246 UG/DL (ref 155–355)
WBC # BLD AUTO: 7.18 THOUSAND/UL (ref 4.31–10.16)

## 2025-07-23 PROCEDURE — 82728 ASSAY OF FERRITIN: CPT

## 2025-07-23 PROCEDURE — 82306 VITAMIN D 25 HYDROXY: CPT

## 2025-07-23 PROCEDURE — 36415 COLL VENOUS BLD VENIPUNCTURE: CPT

## 2025-07-23 PROCEDURE — 82747 ASSAY OF FOLIC ACID RBC: CPT

## 2025-07-23 PROCEDURE — 84439 ASSAY OF FREE THYROXINE: CPT

## 2025-07-23 PROCEDURE — 83036 HEMOGLOBIN GLYCOSYLATED A1C: CPT

## 2025-07-23 PROCEDURE — 85027 COMPLETE CBC AUTOMATED: CPT

## 2025-07-23 PROCEDURE — 80053 COMPREHEN METABOLIC PANEL: CPT

## 2025-07-23 PROCEDURE — 83550 IRON BINDING TEST: CPT

## 2025-07-23 PROCEDURE — 83540 ASSAY OF IRON: CPT

## 2025-07-23 PROCEDURE — 84443 ASSAY THYROID STIM HORMONE: CPT

## 2025-07-23 PROCEDURE — 80061 LIPID PANEL: CPT

## 2025-07-25 LAB
FOLATE BLD-MCNC: 391 NG/ML
FOLATE RBC-MCNC: 916 NG/ML
HCT VFR BLD AUTO: 42.7 % (ref 34–46.6)